# Patient Record
Sex: FEMALE | URBAN - METROPOLITAN AREA
[De-identification: names, ages, dates, MRNs, and addresses within clinical notes are randomized per-mention and may not be internally consistent; named-entity substitution may affect disease eponyms.]

---

## 2017-05-01 ENCOUNTER — APPOINTMENT (RX ONLY)
Dept: URBAN - METROPOLITAN AREA OTHER 12 | Facility: OTHER | Age: 67
Setting detail: DERMATOLOGY
End: 2017-05-01

## 2017-05-01 DIAGNOSIS — C84.A CUTANEOUS T-CELL LYMPHOMA, UNSPECIFIED: ICD-10-CM

## 2017-05-01 PROBLEM — L30.9 DERMATITIS, UNSPECIFIED: Status: ACTIVE | Noted: 2017-05-01

## 2017-05-01 PROCEDURE — 11100: CPT

## 2017-05-01 PROCEDURE — 11101: CPT

## 2017-05-01 PROCEDURE — ? PRESCRIPTION

## 2017-05-01 PROCEDURE — ? BIOPSY BY SHAVE METHOD

## 2017-05-01 RX ORDER — TRIAMCINOLONE ACETONIDE 1 MG/G
OINTMENT TOPICAL
Qty: 1 | Refills: 2 | Status: ERX | COMMUNITY
Start: 2017-05-01

## 2017-05-01 RX ADMIN — TRIAMCINOLONE ACETONIDE: 1 OINTMENT TOPICAL at 00:00

## 2017-05-01 ASSESSMENT — LOCATION SIMPLE DESCRIPTION DERM
LOCATION SIMPLE: RIGHT UPPER BACK
LOCATION SIMPLE: RIGHT POSTERIOR UPPER ARM

## 2017-05-01 ASSESSMENT — LOCATION ZONE DERM
LOCATION ZONE: TRUNK
LOCATION ZONE: ARM

## 2017-05-01 ASSESSMENT — LOCATION DETAILED DESCRIPTION DERM
LOCATION DETAILED: RIGHT INFERIOR UPPER BACK
LOCATION DETAILED: RIGHT PROXIMAL POSTERIOR UPPER ARM

## 2017-05-01 NOTE — PROCEDURE: BIOPSY BY SHAVE METHOD
Size Of Lesion In Cm: 0
Anesthesia Type: 2% lidocaine with epinephrine and a 1:10 solution of 8.4% sodium bicarbonate
Biopsy Method: Personna blade
Wound Care: Vaseline
Electrodesiccation And Curettage Text: The wound bed was treated with electrodesiccation and curettage after the biopsy was performed.
Lab Facility: [id number]
Cryotherapy Text: The wound bed was treated with cryotherapy after the biopsy was performed.
Notification Instructions: Patient will be notified of biopsy results. However, patient instructed to call the office if not contacted within 2 weeks.
Bill For Surgical Tray: no
Anesthesia Volume In Cc: 0.8
Detail Level: Detailed
Dressing: bandage
Electrodesiccation Text: The wound bed was treated with electrodesiccation after the biopsy was performed.
Type Of Destruction Used: Curettage
Silver Nitrate Text: The wound bed was treated with silver nitrate after the biopsy was performed.
Consent: Written consent was obtained and risks were reviewed including but not limited to scarring, infection, bleeding, scabbing, incomplete removal, nerve damage and allergy to anesthesia.
Hemostasis: Drysol
Lab: [id number]
Billing Type: Third-Party Bill
Biopsy Type: H and E
Post-Care Instructions: I reviewed with the patient in detail post-care instructions. Patient is to keep the biopsy site dry overnight, and then apply bacitracin twice daily until healed. Patient may apply hydrogen peroxide soaks to remove any crusting.
Anesthesia Volume In Cc: 0.7

## 2017-05-09 ENCOUNTER — RX ONLY (OUTPATIENT)
Age: 67
Setting detail: RX ONLY
End: 2017-05-09

## 2017-05-09 RX ORDER — MOMETASONE FUROATE 1 MG/G
OINTMENT TOPICAL
Qty: 2 | Refills: 2 | Status: ERX | COMMUNITY
Start: 2017-05-09

## 2018-06-07 ENCOUNTER — APPOINTMENT (RX ONLY)
Dept: URBAN - METROPOLITAN AREA OTHER 12 | Facility: OTHER | Age: 68
Setting detail: DERMATOLOGY
End: 2018-06-07

## 2018-06-07 DIAGNOSIS — L30.9 DERMATITIS, UNSPECIFIED: ICD-10-CM

## 2018-06-07 PROCEDURE — ? BIOPSY BY PUNCH METHOD

## 2018-06-07 PROCEDURE — ? PRESCRIPTION

## 2018-06-07 PROCEDURE — ? TREATMENT REGIMEN

## 2018-06-07 PROCEDURE — 11100: CPT

## 2018-06-07 RX ORDER — HYDROCORTISONE 25 MG/G
OINTMENT TOPICAL
Qty: 1 | Refills: 2 | Status: ERX | COMMUNITY
Start: 2018-06-07

## 2018-06-07 RX ORDER — TRIAMCINOLONE ACETONIDE 1 MG/G
OINTMENT TOPICAL
Qty: 1 | Refills: 2 | Status: ERX

## 2018-06-07 RX ADMIN — HYDROCORTISONE: 25 OINTMENT TOPICAL at 00:00

## 2018-06-07 ASSESSMENT — LOCATION ZONE DERM: LOCATION ZONE: TRUNK

## 2018-06-07 ASSESSMENT — LOCATION DETAILED DESCRIPTION DERM: LOCATION DETAILED: LEFT MID-UPPER BACK

## 2018-06-07 ASSESSMENT — LOCATION SIMPLE DESCRIPTION DERM: LOCATION SIMPLE: LEFT UPPER BACK

## 2018-06-07 NOTE — PROCEDURE: TREATMENT REGIMEN
Discontinue Regimen: All OTC topical preparations
Detail Level: Zone
Initiate Treatment: CeraVe cream and cleanser (coupon given)

## 2018-06-07 NOTE — PROCEDURE: BIOPSY BY PUNCH METHOD
Size Of Lesion In Cm (Optional): 0
Lab: -[id number]
Notification Instructions: Patient will be notified of biopsy results. However, patient instructed to call the office if not contacted within 2 weeks.
Lab Facility: [id number]
Anesthesia Volume In Cc: 1
Bill For Surgical Tray: no
Number Of Epidermal Sutures (Optional): 2
Anesthesia Type: 1% lidocaine with 1:100,000 epinephrine and a 1:10 solution of 8.4% sodium bicarbonate
Was A Bandage Applied: Yes
Hemostasis: None
Detail Level: Detailed
Dressing: bandage
Suture Removal: 7 days
Wound Care: Vaseline
Epidermal Sutures: 5-0 Prolene
Post-Care Instructions: I reviewed with the patient in detail post-care instructions. Patient is to keep the biopsy site dry overnight, and then apply bacitracin twice daily until healed. Patient may apply hydrogen peroxide soaks to remove any crusting.
Punch Size In Mm: 4
Home Suture Removal Text: Patient was provided a home suture removal kit and will remove their sutures at home.  If they have any questions or difficulties they will call the office.
Billing Type: Third-Party Bill
Consent: Written consent was obtained and risks were reviewed including but not limited to scarring, infection, bleeding, scabbing, incomplete removal, nerve damage and allergy to anesthesia.
Biopsy Type: H and E

## 2018-06-21 ENCOUNTER — APPOINTMENT (RX ONLY)
Dept: URBAN - METROPOLITAN AREA OTHER 12 | Facility: OTHER | Age: 68
Setting detail: DERMATOLOGY
End: 2018-06-21

## 2018-06-21 DIAGNOSIS — L30.9 DERMATITIS, UNSPECIFIED: ICD-10-CM

## 2018-06-21 PROCEDURE — 11100: CPT

## 2018-06-21 PROCEDURE — ? BIOPSY BY PUNCH METHOD

## 2018-06-21 ASSESSMENT — LOCATION SIMPLE DESCRIPTION DERM: LOCATION SIMPLE: RIGHT UPPER BACK

## 2018-06-21 ASSESSMENT — LOCATION ZONE DERM: LOCATION ZONE: TRUNK

## 2018-06-21 ASSESSMENT — LOCATION DETAILED DESCRIPTION DERM: LOCATION DETAILED: RIGHT SUPERIOR UPPER BACK

## 2018-06-21 NOTE — PROCEDURE: BIOPSY BY PUNCH METHOD
Size Of Lesion In Cm (Optional): 0
Dressing: bandage
Anesthesia Type: 1% lidocaine with 1:100,000 epinephrine and a 1:10 solution of 8.4% sodium bicarbonate
Home Suture Removal Text: Patient was provided a home suture removal kit and will remove their sutures at home.  If they have any questions or difficulties they will call the office.
Bill For Surgical Tray: no
Suture Removal: 7 days
Anesthesia Volume In Cc: 0.5
Detail Level: Detailed
Lab Facility: [id number]
Consent: Written consent was obtained and risks were reviewed including but not limited to scarring, infection, bleeding, scabbing, incomplete removal, nerve damage and allergy to anesthesia.
Biopsy Type: H and E
Notification Instructions: Patient will be notified of biopsy results. However, patient instructed to call the office if not contacted within 2 weeks.
Billing Type: Third-Party Bill
Hemostasis: None
Was A Bandage Applied: Yes
Post-Care Instructions: I reviewed with the patient in detail post-care instructions. Patient is to keep the biopsy site dry overnight, and then apply bacitracin twice daily until healed. Patient may apply hydrogen peroxide soaks to remove any crusting.
Path Notes (To The Dermatopathologist): Patient has not applied topical steroids to back
Punch Size In Mm: 4
Wound Care: Vaseline
Epidermal Sutures: 5-0 Prolene
Lab: -[id number]

## 2018-07-11 ENCOUNTER — APPOINTMENT (RX ONLY)
Dept: URBAN - METROPOLITAN AREA OTHER 12 | Facility: OTHER | Age: 68
Setting detail: DERMATOLOGY
End: 2018-07-11

## 2018-07-11 DIAGNOSIS — C84.0 MYCOSIS FUNGOIDES: ICD-10-CM

## 2018-07-11 PROBLEM — C84.00 MYCOSIS FUNGOIDES, UNSPECIFIED SITE: Status: ACTIVE | Noted: 2018-07-11

## 2018-07-11 PROCEDURE — ? PHOTOTHERAPY TREATMENT

## 2018-07-11 PROCEDURE — ? COUNSELING

## 2018-07-11 PROCEDURE — 96910 PHOTCHMTX TAR&UVB/PTRLTM&UVB: CPT

## 2018-07-11 PROCEDURE — ? TREATMENT REGIMEN

## 2018-07-11 PROCEDURE — ? PRESCRIPTION

## 2018-07-11 PROCEDURE — ? NARROW BAND UVB ORDER

## 2018-07-11 RX ORDER — BETAMETHASONE DIPROPIONATE 0.5 MG/G
OINTMENT TOPICAL
Qty: 1 | Refills: 5 | Status: ERX | COMMUNITY
Start: 2018-07-11

## 2018-07-11 RX ADMIN — BETAMETHASONE DIPROPIONATE: 0.5 OINTMENT TOPICAL at 00:00

## 2018-07-11 ASSESSMENT — LOCATION SIMPLE DESCRIPTION DERM
LOCATION SIMPLE: UPPER BACK
LOCATION SIMPLE: ABDOMEN
LOCATION SIMPLE: LEFT CHEEK
LOCATION SIMPLE: LEFT POPLITEAL SKIN
LOCATION SIMPLE: RIGHT POPLITEAL SKIN
LOCATION SIMPLE: LEFT LOWER BACK

## 2018-07-11 ASSESSMENT — LOCATION DETAILED DESCRIPTION DERM
LOCATION DETAILED: RIGHT POPLITEAL SKIN
LOCATION DETAILED: LEFT INFERIOR LATERAL MIDBACK
LOCATION DETAILED: LEFT POPLITEAL SKIN
LOCATION DETAILED: INFERIOR THORACIC SPINE
LOCATION DETAILED: PERIUMBILICAL SKIN
LOCATION DETAILED: LEFT INFERIOR CENTRAL MALAR CHEEK

## 2018-07-11 ASSESSMENT — LOCATION ZONE DERM
LOCATION ZONE: TRUNK
LOCATION ZONE: LEG
LOCATION ZONE: FACE

## 2018-07-11 NOTE — PROCEDURE: NARROW BAND UVB ORDER
Detail Level: Generalized
Frequency: TIW
Dose: 80% MED
Consent: Written consent obtained.  The risks were reviewed with the patient including but not limited to: burn, pigmentary changes, pain, blistering, scabbing, redness, increased risk of skin cancers, and the remote possibility of scarring.
Protocol: NBUVB

## 2018-07-11 NOTE — PROCEDURE: PHOTOTHERAPY TREATMENT
Protocol For Photochemotherapy For Severe Photoresponsive Dermatoses: Puva: The patient received Photochemotherapy for severe photoresponsive dermatoses: PUVA requiring at least 4 to 8 hours of care under direct physician supervision.
Protocol For Photochemotherapy For Severe Photoresponsive Dermatoses: Tar And Broad Band Uvb (Goeckerman Treatment): The patient received Photochemotherapy for severe photoresponsive dermatoses: Tar and Broad Band UVB (Goeckerman treatment) requiring at least 4 to 8 hours of care under direct physician supervision.
Protocol For Photochemotherapy: Mineral Oil And Broad Band Uvb: The patient received Photochemotherapy: Mineral Oil and Broad Band UVB.
Protocol: Photochemotherapy: Petrolatum and NBUVB
Protocol For Photochemotherapy: Tar And Broad Band Uvb (Goeckerman Treatment): The patient received Photochemotherapy: Tar and Broad Band UVB (Goeckerman treatment).
Protocol For Photochemotherapy For Severe Photoresponsive Dermatoses: Petrolatum And Broad Band Uvb: The patient received Photochemotherapyfor severe photoresponsive dermatoses: Petrolatum and Broad Band UVB requiring at least 4 to 8 hours of care under direct physician supervision.
Protocol For Photochemotherapy For Severe Photoresponsive Dermatoses: Tar And Nbuvb (Goeckerman Treatment): The patient received Photochemotherapy for severe photoresponsive dermatoses: Tar and NBUVB (Goeckerman treatment) requiring at least 4 to 8 hours of care under direct physician supervision.
Protocol For Uva: The patient received UVA.
Protocol For Photochemotherapy: Mineral Oil And Nbuvb: The patient received Photochemotherapy: Mineral Oil and NBUVB (mineral oil applied to all lesions prior to phototherapy).
Protocol For Protocol For Photochemotherapy For Severe Photoresponsive Dermatoses: Bath Puva: The patient received Photochemotherapy for severe photoresponsive dermatoses: Bath PUVA requiring at least 4 to 8 hours of care under direct physician supervision.
Protocol For Photochemotherapy: Baby Oil And Nbuvb: The patient received Photochemotherapy: Baby Oil and NBUVB (baby oil applied to all lesions prior to phototherapy).
Protocol For Photochemotherapy: Triamcinolone Ointment And Nbuvb: The patient received Photochemotherapy: Triamcinolone and NBUVB (triamcinolone ointment applied to all lesions prior to phototherapy).
Protocol For Photochemotherapy: Petrolatum And Broad Band Uvb: The patient received Photochemotherapy: Petrolatum and Broad Band UVB.
Detail Level: Generalized
Protocol For Photochemotherapy For Severe Photoresponsive Dermatoses: Petrolatum And Nbuvb: The patient received Photochemotherapy for severe photoresponsive dermatoses: Petrolatum and NBUVB requiring at least 4 to 8 hours of care under direct physician supervision.
Post-Care Instructions: I reviewed with the patient in detail post-care instructions. Patient is to wear sun protection. Patients may expect sunburn like redness, discomfort and scabbing.
Protocol For Broad Band Uvb: The patient received Broad Band UVB.
Protocol For Nb Uva: The patient received NB UVA.
Total Body Energy: 999
Treatment Number: 1
Protocol For Puva: The patient received PUVA.
Protocol For Nbuvb: The patient received NBUVB.
Protocol For Uva1: The patient received UVA1.
Protocol For Photochemotherapy: Petrolatum And Nbuvb: The patient received Photochemotherapy: Petrolatum and NBUVB (petrolatum applied to all lesions prior to phototherapy).
Consent: Written consent obtained.  The risks were reviewed with the patient including but not limited to: burn, pigmentary changes, pain, blistering, scabbing, redness, increased risk of skin cancers, and the remote possibility of scarring.
Protocol For Bath Puva: The patient received Bath PUVA.
Protocol For Photochemotherapy: Tar And Nbuvb (Goeckerman Treatment): The patient received Photochemotherapy: Tar and NBUVB (Goeckerman treatment).

## 2018-07-11 NOTE — PROCEDURE: TREATMENT REGIMEN
Otc Regimen: CeraVe and Dove gentle cleanser
Discontinue Regimen: TAC
Plan: Patient signed ROD, chart note and biopsy result will be faxed to Dr. Horan\\nD/W Dr. Astudillo-referral to Dr. Griggs, Oncology.  Will consider treating with PUVB 2 x weekly after Oncology evaluation
Continue Regimen: Hydrocortisone ointment on the face
Detail Level: Zone

## 2018-07-12 ENCOUNTER — RX ONLY (OUTPATIENT)
Age: 68
Setting detail: RX ONLY
End: 2018-07-12

## 2018-07-12 RX ORDER — EMOLLIENT COMBINATION NO.10
EMULSION (GRAM) TOPICAL
Qty: 1 | Refills: 2 | Status: ERX | COMMUNITY
Start: 2018-07-12

## 2018-08-10 ENCOUNTER — APPOINTMENT (RX ONLY)
Dept: URBAN - METROPOLITAN AREA OTHER 12 | Facility: OTHER | Age: 68
Setting detail: DERMATOLOGY
End: 2018-08-10

## 2018-08-17 ENCOUNTER — APPOINTMENT (RX ONLY)
Dept: URBAN - METROPOLITAN AREA OTHER 12 | Facility: OTHER | Age: 68
Setting detail: DERMATOLOGY
End: 2018-08-17

## 2018-08-17 DIAGNOSIS — C84.0 MYCOSIS FUNGOIDES: ICD-10-CM

## 2018-08-17 PROBLEM — C84.00 MYCOSIS FUNGOIDES, UNSPECIFIED SITE: Status: ACTIVE | Noted: 2018-08-17

## 2018-08-17 PROCEDURE — ? TREATMENT REGIMEN

## 2018-08-17 PROCEDURE — ? PRESCRIPTION

## 2018-08-17 PROCEDURE — 99213 OFFICE O/P EST LOW 20 MIN: CPT

## 2018-08-17 RX ORDER — HYDROCORTISONE 25 MG/G
CREAM TOPICAL
Qty: 1 | Refills: 2 | Status: ERX | COMMUNITY
Start: 2018-08-17

## 2018-08-17 RX ORDER — CLOBETASOL PROPIONATE 0.5 MG/G
CREAM TOPICAL BID
Qty: 1 | Refills: 3 | Status: ERX | COMMUNITY
Start: 2018-08-17

## 2018-08-17 RX ADMIN — CLOBETASOL PROPIONATE: 0.5 CREAM TOPICAL at 19:38

## 2018-08-17 RX ADMIN — HYDROCORTISONE: 25 CREAM TOPICAL at 19:43

## 2018-08-17 ASSESSMENT — LOCATION ZONE DERM: LOCATION ZONE: TRUNK

## 2018-08-17 ASSESSMENT — LOCATION DETAILED DESCRIPTION DERM: LOCATION DETAILED: EPIGASTRIC SKIN

## 2018-08-17 ASSESSMENT — LOCATION SIMPLE DESCRIPTION DERM: LOCATION SIMPLE: ABDOMEN

## 2018-08-17 NOTE — PROCEDURE: TREATMENT REGIMEN
Plan: Briefly reviewed Dr. Griggs and Dr. Tyler's treatment recommendations.  At this time we will change topicals and follow up after her next visit with Dr. Griggs
Detail Level: Zone

## 2018-09-12 ENCOUNTER — APPOINTMENT (RX ONLY)
Dept: URBAN - METROPOLITAN AREA OTHER 12 | Facility: OTHER | Age: 68
Setting detail: DERMATOLOGY
End: 2018-09-12

## 2018-09-12 DIAGNOSIS — L82.0 INFLAMED SEBORRHEIC KERATOSIS: ICD-10-CM

## 2018-09-12 DIAGNOSIS — C84.0 MYCOSIS FUNGOIDES: ICD-10-CM

## 2018-09-12 PROBLEM — C84.00 MYCOSIS FUNGOIDES, UNSPECIFIED SITE: Status: ACTIVE | Noted: 2018-09-12

## 2018-09-12 PROCEDURE — ? PRESCRIPTION

## 2018-09-12 PROCEDURE — 99213 OFFICE O/P EST LOW 20 MIN: CPT | Mod: 25

## 2018-09-12 PROCEDURE — 17110 DESTRUCTION B9 LES UP TO 14: CPT

## 2018-09-12 PROCEDURE — ? TREATMENT REGIMEN

## 2018-09-12 PROCEDURE — ? LIQUID NITROGEN

## 2018-09-12 RX ORDER — TRIAMCINOLONE ACETONIDE 0.25 MG/G
CREAM TOPICAL
Qty: 1 | Refills: 2 | Status: ERX | COMMUNITY
Start: 2018-09-12

## 2018-09-12 RX ADMIN — TRIAMCINOLONE ACETONIDE: 0.25 CREAM TOPICAL at 18:10

## 2018-09-12 ASSESSMENT — LOCATION ZONE DERM
LOCATION ZONE: TRUNK
LOCATION ZONE: NOSE

## 2018-09-12 ASSESSMENT — LOCATION SIMPLE DESCRIPTION DERM
LOCATION SIMPLE: RIGHT UPPER BACK
LOCATION SIMPLE: NOSE

## 2018-09-12 ASSESSMENT — LOCATION DETAILED DESCRIPTION DERM
LOCATION DETAILED: RIGHT NASAL DORSUM
LOCATION DETAILED: RIGHT MEDIAL UPPER BACK

## 2018-09-12 NOTE — HPI: RASH (MYCOSIS FUNGOIDES)
Is This A New Presentation, Or A Follow-Up?: Follow Up Mycosis Fungoides
Additional History: The patient saw Devyn Griggs MD at [facility] on 8/31/2018. She was started with Baraxotene. She started last week at one capsule a day. Every other day she is increasing the dose. Her current dose is 4 pills a day and will increase to 8 pills a day.

## 2018-09-12 NOTE — PROCEDURE: TREATMENT REGIMEN
Detail Level: Generalized
Continue Regimen: Hydrocortisone 2.5%: Apply to the face BID when flared\\nClobetasol 0.5% cream: apply to affected areas on the body BID
Plan: Keep follow up with Devyn Griggs MD on 09/24/2018 \\nDiscussed adding NBUVB and PUVA but per patient Dr. Griggs does not recommend at this time due to patient burning in NBUVB earlier this year.  May re-visit after static dose of Targretin

## 2020-02-05 ENCOUNTER — APPOINTMENT (RX ONLY)
Dept: URBAN - METROPOLITAN AREA OTHER 11 | Facility: OTHER | Age: 70
Setting detail: DERMATOLOGY
End: 2020-02-05

## 2020-02-05 DIAGNOSIS — C84.0 MYCOSIS FUNGOIDES: ICD-10-CM

## 2020-02-05 DIAGNOSIS — L82.0 INFLAMED SEBORRHEIC KERATOSIS: ICD-10-CM

## 2020-02-05 PROBLEM — C84.00 MYCOSIS FUNGOIDES, UNSPECIFIED SITE: Status: ACTIVE | Noted: 2020-02-05

## 2020-02-05 PROCEDURE — ? LIQUID NITROGEN

## 2020-02-05 PROCEDURE — 17110 DESTRUCTION B9 LES UP TO 14: CPT

## 2020-02-05 PROCEDURE — 99212 OFFICE O/P EST SF 10 MIN: CPT | Mod: 25

## 2020-02-05 PROCEDURE — ? TREATMENT REGIMEN

## 2020-02-05 ASSESSMENT — LOCATION DETAILED DESCRIPTION DERM
LOCATION DETAILED: RIGHT SUPERIOR LATERAL MALAR CHEEK
LOCATION DETAILED: LEFT SUPERIOR UPPER BACK

## 2020-02-05 ASSESSMENT — LOCATION ZONE DERM
LOCATION ZONE: TRUNK
LOCATION ZONE: FACE

## 2020-02-05 ASSESSMENT — LOCATION SIMPLE DESCRIPTION DERM
LOCATION SIMPLE: RIGHT CHEEK
LOCATION SIMPLE: LEFT UPPER BACK

## 2020-02-05 NOTE — PROCEDURE: LIQUID NITROGEN

## 2020-06-03 ENCOUNTER — APPOINTMENT (RX ONLY)
Dept: URBAN - METROPOLITAN AREA OTHER 11 | Facility: OTHER | Age: 70
Setting detail: DERMATOLOGY
End: 2020-06-03

## 2020-06-03 DIAGNOSIS — D485 NEOPLASM OF UNCERTAIN BEHAVIOR OF SKIN: ICD-10-CM

## 2020-06-03 PROBLEM — D48.5 NEOPLASM OF UNCERTAIN BEHAVIOR OF SKIN: Status: ACTIVE | Noted: 2020-06-03

## 2020-06-03 PROCEDURE — 11102 TANGNTL BX SKIN SINGLE LES: CPT

## 2020-06-03 PROCEDURE — ? BIOPSY BY SHAVE METHOD

## 2020-06-03 PROCEDURE — ? PRESCRIPTION

## 2020-06-03 RX ORDER — TRIAMCINOLONE ACETONIDE 1 MG/G
CREAM TOPICAL
Qty: 1 | Refills: 2 | Status: ERX | COMMUNITY
Start: 2020-06-03

## 2020-06-03 RX ORDER — AMMONIUM LACTATE 12 G/100G
CREAM TOPICAL
Qty: 2 | Refills: 1 | Status: ERX | COMMUNITY
Start: 2020-06-03

## 2020-06-03 RX ADMIN — TRIAMCINOLONE ACETONIDE: 1 CREAM TOPICAL at 00:00

## 2020-06-03 RX ADMIN — AMMONIUM LACTATE: 12 CREAM TOPICAL at 00:00

## 2020-06-03 ASSESSMENT — LOCATION SIMPLE DESCRIPTION DERM: LOCATION SIMPLE: RIGHT THIGH

## 2020-06-03 ASSESSMENT — LOCATION DETAILED DESCRIPTION DERM: LOCATION DETAILED: RIGHT ANTERIOR DISTAL THIGH

## 2020-06-03 ASSESSMENT — LOCATION ZONE DERM: LOCATION ZONE: LEG
